# Patient Record
Sex: FEMALE | Race: WHITE | Employment: STUDENT | URBAN - METROPOLITAN AREA
[De-identification: names, ages, dates, MRNs, and addresses within clinical notes are randomized per-mention and may not be internally consistent; named-entity substitution may affect disease eponyms.]

---

## 2017-02-11 DIAGNOSIS — F41.1 ANXIETY, GENERALIZED: ICD-10-CM

## 2017-02-11 RX ORDER — ESCITALOPRAM OXALATE 10 MG/1
TABLET ORAL
Qty: 30 TAB | Refills: 1 | Status: SHIPPED | OUTPATIENT
Start: 2017-02-11 | End: 2017-04-12 | Stop reason: SDUPTHER

## 2017-07-13 DIAGNOSIS — F41.1 ANXIETY, GENERALIZED: ICD-10-CM

## 2017-07-13 RX ORDER — ESCITALOPRAM OXALATE 10 MG/1
TABLET ORAL
Qty: 10 TAB | Refills: 0 | Status: SHIPPED | OUTPATIENT
Start: 2017-07-13 | End: 2021-08-31

## 2020-11-23 ENCOUNTER — OFFICE VISIT (OUTPATIENT)
Dept: URGENT CARE | Age: 25
End: 2020-11-23
Payer: COMMERCIAL

## 2020-11-23 VITALS — RESPIRATION RATE: 14 BRPM | HEART RATE: 92 BPM | OXYGEN SATURATION: 98 % | TEMPERATURE: 98.5 F

## 2020-11-23 DIAGNOSIS — Z20.822 ENCOUNTER FOR LABORATORY TESTING FOR COVID-19 VIRUS: Primary | ICD-10-CM

## 2020-11-23 PROCEDURE — 99202 OFFICE O/P NEW SF 15 MIN: CPT | Performed by: FAMILY MEDICINE

## 2020-11-23 NOTE — PROGRESS NOTES
This patient was seen in [facility] at [address] [place] [address] [facility]gent Care while in their vehicle due to COVID-19 pandemic with PPE and focused examination in order to decrease community viral transmission. The patient/guardian gave verbal consent to treat. The history is provided by the patient. Asymptomatic  Exposure from co-worker with covid positive but  no direct exposure     History reviewed. No pertinent past medical history. History reviewed. No pertinent surgical history. History reviewed. No pertinent family history.      Social History     Socioeconomic History    Marital status: UNKNOWN     Spouse name: Not on file    Number of children: Not on file    Years of education: Not on file    Highest education level: Not on file   Occupational History    Not on file   Social Needs    Financial resource strain: Not on file    Food insecurity     Worry: Not on file     Inability: Not on file    Transportation needs     Medical: Not on file     Non-medical: Not on file   Tobacco Use    Smoking status: Never Smoker    Smokeless tobacco: Never Used   Substance and Sexual Activity    Alcohol use: Not on file    Drug use: Not on file    Sexual activity: Not on file   Lifestyle    Physical activity     Days per week: Not on file     Minutes per session: Not on file    Stress: Not on file   Relationships    Social connections     Talks on phone: Not on file     Gets together: Not on file     Attends Sikhism service: Not on file     Active member of club or organization: Not on file     Attends meetings of clubs or organizations: Not on file     Relationship status: Not on file    Intimate partner violence     Fear of current or ex partner: Not on file     Emotionally abused: Not on file     Physically abused: Not on file     Forced sexual activity: Not on file   Other Topics Concern    Not on file   Social History Narrative    Not on file                ALLERGIES: Patient has no known allergies. Review of Systems   All other systems reviewed and are negative. Vitals:    11/23/20 0844   Pulse: 92   Resp: 14   Temp: 98.5 °F (36.9 °C)   SpO2: 98%       Physical Exam  Vitals signs and nursing note reviewed. Constitutional:       General: She is not in acute distress. Appearance: She is not ill-appearing. Pulmonary:      Effort: Pulmonary effort is normal. No respiratory distress. Breath sounds: No wheezing. MDM    Procedures      ICD-10-CM ICD-9-CM    1. Encounter for laboratory testing for COVID-19 virus  Z20.828 V01.79 NOVEL CORONAVIRUS (COVID-19)     No orders of the defined types were placed in this encounter. No results found for any visits on 11/23/20. The patients condition was discussed with the patient and they understand. The patient is to follow up with primary care doctor. If signs and symptoms become worse the pt is to go to the ER. The patient is to take medications as prescribed.

## 2020-11-25 LAB — SARS-COV-2, NAA: NOT DETECTED

## 2021-08-31 ENCOUNTER — OFFICE VISIT (OUTPATIENT)
Dept: URGENT CARE | Age: 26
End: 2021-08-31
Payer: COMMERCIAL

## 2021-08-31 VITALS — RESPIRATION RATE: 17 BRPM | OXYGEN SATURATION: 99 % | TEMPERATURE: 97.9 F | HEART RATE: 106 BPM

## 2021-08-31 DIAGNOSIS — R05.9 COUGH: Primary | ICD-10-CM

## 2021-08-31 DIAGNOSIS — J02.9 SORE THROAT: ICD-10-CM

## 2021-08-31 DIAGNOSIS — Z11.59 SCREENING FOR VIRAL DISEASE: ICD-10-CM

## 2021-08-31 LAB
S PYO AG THROAT QL: NEGATIVE
SARS-COV-2 POC: NEGATIVE
VALID INTERNAL CONTROL?: YES

## 2021-08-31 PROCEDURE — 87426 SARSCOV CORONAVIRUS AG IA: CPT | Performed by: FAMILY MEDICINE

## 2021-08-31 PROCEDURE — 87880 STREP A ASSAY W/OPTIC: CPT | Performed by: FAMILY MEDICINE

## 2021-08-31 PROCEDURE — 99203 OFFICE O/P NEW LOW 30 MIN: CPT | Performed by: FAMILY MEDICINE

## 2021-08-31 NOTE — PROGRESS NOTES
This patient was seen at [address] [place] [address] Urgent Care while in their vehicle due to COVID-19 pandemic with PPE and focused examination in order to decrease community viral transmission. The patient/guardian gave verbal consent to treat. Griffin Nguyen is a 30 y.o. female who presents cough and ST x 3 days. No known COVID contacts. Denies  fever, SOB, N/V/D. 8 weeks pregnant. Has been taking zyrtec daily. The history is provided by the patient. Past Medical History:   Diagnosis Date    Anxiety     Esophagitis, reflux     Headache     migraine+tension    Rhinitis         Past Surgical History:   Procedure Laterality Date    HX TONSILLECTOMY      HX TYMPANOSTOMY           Family History   Problem Relation Age of Onset    Alcohol abuse Mother    [facility] Anemia Mother     Drug Abuse Father     Alcohol abuse Father         Social History     Socioeconomic History    Marital status: SINGLE     Spouse name: Not on file    Number of children: Not on file    Years of education: Not on file    Highest education level: Not on file   Occupational History    Not on file   Tobacco Use    Smoking status: Never Smoker   Substance and Sexual Activity    Alcohol use: No    Drug use: No    Sexual activity: Not on file   Other Topics Concern    Not on file   Social History Narrative    Not on file     Social Determinants of Health     Financial Resource Strain:     Difficulty of Paying Living Expenses:    Food Insecurity:     Worried About Running Out of Food in the Last Year:     Ran Out of Food in the Last Year:    Transportation Needs:     Lack of Transportation (Medical):      Lack of Transportation (Non-Medical):    Physical Activity:     Days of Exercise per Week:     Minutes of Exercise per Session:    Stress:     Feeling of Stress :    Social Connections:     Frequency of Communication with Friends and Family:     Frequency of Social Gatherings with Friends and Family:     Attends Amish Services:     Active Member of Clubs or Organizations:     Attends Club or Organization Meetings:     Marital Status:    Intimate Partner Violence:     Fear of Current or Ex-Partner:     Emotionally Abused:     Physically Abused:     Sexually Abused: ALLERGIES: Sumatriptan    Review of Systems   Constitutional: Negative for activity change, appetite change and fever. HENT: Positive for sore throat. Respiratory: Positive for cough. Negative for shortness of breath. Gastrointestinal: Negative for diarrhea, nausea and vomiting. Vitals:    08/31/21 1218   Pulse: (!) 106   Resp: 17   Temp: 97.9 °F (36.6 °C)   SpO2: 99%       Physical Exam  Vitals and nursing note reviewed. Constitutional:       General: She is not in acute distress. Appearance: She is well-developed. She is not diaphoretic. HENT:      Mouth/Throat:      Mouth: Mucous membranes are moist.      Pharynx: Oropharynx is clear. Posterior oropharyngeal erythema present. No oropharyngeal exudate. Pulmonary:      Effort: Pulmonary effort is normal. No respiratory distress. Breath sounds: Normal breath sounds. No stridor. No wheezing, rhonchi or rales. Neurological:      Mental Status: She is alert. Psychiatric:         Behavior: Behavior normal.         Thought Content: Thought content normal.         Judgment: Judgment normal.         MDM    ICD-10-CM ICD-9-CM   1. Cough  R05 786.2   2. Sore throat  J02.9 462   3.  Screening for viral disease  Z11.59 V73.99       Orders Placed This Encounter    UPPER RESPIRATORY CULTURE    NOVEL CORONAVIRUS (COVID-19)     Scheduling Instructions:      1) Due to current limited availability of the COVID-19 PCR test, tests will be prioritized and may not be completed.              2) Order only if the test result will change clinical management or necessary for a return to mission-critical employment decision.              3) Print and instruct patient to adhere to CDC home isolation program. (Link Above)              4) Set up or refer patient for a monitoring program.              5) Have patient sign up for and leverage MyChart (if not previously done). Order Specific Question:   Is this test for diagnosis or screening? Answer:   Diagnosis of ill patient     Order Specific Question:   Symptomatic for COVID-19 as defined by CDC? Answer:   Yes     Order Specific Question:   Date of Symptom Onset     Answer:   8/28/2021     Order Specific Question:   Hospitalized for COVID-19? Answer:   No     Order Specific Question:   Admitted to ICU for COVID-19? Answer:   No     Order Specific Question:   Employed in healthcare setting? Answer:   Unknown     Order Specific Question:   Resident in a congregate (group) care setting? Answer:   No     Order Specific Question:   Pregnant? Answer:   Yes     Order Specific Question:   Previously tested for COVID-19? Answer:   Unknown    AMB POC RAPID STREP A    AMB POC SARS-COV-2     Order Specific Question:   Is this test for diagnosis or screening? Answer:   Diagnosis of ill patient     Order Specific Question:   Symptomatic for COVID-19 as defined by CDC? Answer:   Yes     Order Specific Question:   Date of Symptom Onset     Answer:   8/28/2021     Order Specific Question:   Hospitalized for COVID-19? Answer:   No     Order Specific Question:   Admitted to ICU for COVID-19? Answer:   No     Order Specific Question:   Employed in healthcare setting? Answer:   Unknown     Order Specific Question:   Resident in a congregate (group) care setting? Answer:   No     Order Specific Question:   Pregnant? Answer:   Yes     Order Specific Question:   Previously tested for COVID-19?      Answer:   Unknown        Quarantine, await PCR, throat cx results  Deep breathing exercises, ambulation  Tylenol prn  Increase fluids with electrolytes if decreased PO intake  MyChart: active  Provider will call if PCR COVID-19 test is positive     If signs and symptoms become worse the pt is to go to the ER.        Results for orders placed or performed in visit on 08/31/21   AMB POC RAPID STREP A   Result Value Ref Range    VALID INTERNAL CONTROL POC Yes     Group A Strep Ag Negative Negative   AMB POC SARS-COV-2   Result Value Ref Range    SARS-COV-2 POC Negative Negative     Procedures

## 2021-09-02 LAB — SARS-COV-2, NAA: NOT DETECTED

## 2021-09-03 LAB — BACTERIA SPEC RESP CULT: NORMAL

## 2023-03-01 ENCOUNTER — HOSPITAL ENCOUNTER (EMERGENCY)
Age: 28
Discharge: HOME | End: 2023-03-01
Attending: EMERGENCY MEDICINE
Payer: COMMERCIAL

## 2023-03-01 VITALS
TEMPERATURE: 97.6 F | HEART RATE: 93 BPM | SYSTOLIC BLOOD PRESSURE: 132 MMHG | BODY MASS INDEX: 32.42 KG/M2 | HEIGHT: 62 IN | RESPIRATION RATE: 18 BRPM | DIASTOLIC BLOOD PRESSURE: 98 MMHG | WEIGHT: 176.15 LBS | OXYGEN SATURATION: 99 %

## 2023-03-01 DIAGNOSIS — H10.31 ACUTE CONJUNCTIVITIS OF RIGHT EYE, UNSPECIFIED ACUTE CONJUNCTIVITIS TYPE: Primary | ICD-10-CM

## 2023-03-01 PROCEDURE — 99283 EMERGENCY DEPT VISIT LOW MDM: CPT

## 2023-03-01 RX ORDER — MULTIVITAMIN
1 TABLET ORAL DAILY
COMMUNITY

## 2023-03-01 RX ORDER — ERYTHROMYCIN 5 MG/G
0.3 OINTMENT OPHTHALMIC EVERY 6 HOURS
Qty: 3.5 G | Refills: 1 | Status: SHIPPED | OUTPATIENT
Start: 2023-03-01 | End: 2023-03-06

## 2023-03-02 NOTE — DISCHARGE INSTRUCTIONS
Thank you for allowing us to provide you with medical care today. We realize that you have many choices for your emergency care needs. We thank you for choosing [facility]. Please choose us in the future for any continued health care needs. The exam and treatment you received in the Emergency Department were for an emergent problem and are not intended as complete care. It is important that you follow up with a doctor, nurse practitioner, or physician's assistant for ongoing care. If your symptoms worsen or you do not improve as expected and you are unable to reach your usual health care provider, you should return to the Emergency Department. We are available 24 hours a day. Please make an appointment with your health care provider(s) for follow up of your Emergency Department visit. Take this sheet with you when you go to your follow-up visit.

## 2023-03-02 NOTE — ED PROVIDER NOTES
29-year-old female presents with a chief complaint of right eye irritation starting today. She denies visual deficits or other symptoms. Past Medical History:   Diagnosis Date    Anxiety     Esophagitis, reflux     Headache     migraine+tension    Rhinitis        Past Surgical History:   Procedure Laterality Date    HX TONSILLECTOMY      HX TYMPANOSTOMY           Family History:   Problem Relation Age of Onset    Alcohol abuse Mother     Anemia Mother     Drug Abuse Father     Alcohol abuse Father        Social History     Socioeconomic History    Marital status:      Spouse name: Not on file    Number of children: Not on file    Years of education: Not on file    Highest education level: Not on file   Occupational History    Not on file   Tobacco Use    Smoking status: Never    Smokeless tobacco: Never   Substance and Sexual Activity    Alcohol use: Yes     Comment: occasional    Drug use: No    Sexual activity: Not on file   Other Topics Concern    Not on file   Social History Narrative    ** Merged History Encounter **          Social Determinants of Health     Financial Resource Strain: Not on file   Food Insecurity: Not on file   Transportation Needs: Not on file   Physical Activity: Not on file   Stress: Not on file   Social Connections: Not on file   Intimate Partner Violence: Not on file   Housing Stability: Not on file         ALLERGIES: Sumatriptan    Review of Systems   Constitutional:  Negative for fever. Eyes:  Positive for itching. Vitals:    03/01/23 2036   BP: (!) 132/98   Pulse: 93   Resp: 18   Temp: 97.6 °F (36.4 °C)   SpO2: 99%   Weight: 79.9 kg (176 lb 2.4 oz)   Height: 5' 2\" (1.575 m)            Physical Exam  Vitals and nursing note reviewed. Constitutional:       General: She is not in acute distress. Appearance: Normal appearance. She is not ill-appearing, toxic-appearing or diaphoretic. HENT:      Head: Normocephalic and atraumatic.       Mouth/Throat:      Mouth: Mucous membranes are moist.   Eyes:      Extraocular Movements: Extraocular movements intact. Comments: Conjunctivitis of the right eye   Cardiovascular:      Rate and Rhythm: Normal rate. Pulses: Normal pulses. Pulmonary:      Effort: Pulmonary effort is normal. No respiratory distress. Abdominal:      General: There is no distension. Musculoskeletal:         General: Normal range of motion. Cervical back: Normal range of motion. Skin:     General: Skin is dry. Neurological:      Mental Status: She is alert and oriented to person, place, and time. Psychiatric:         Mood and Affect: Mood normal.        Medical Decision Making  We will treat for bacterial conjunctivitis with erythromycin. Discussed my clinical impression(s), any labs and/or radiology results with the patient. I answered any questions and addressed any concerns. Discussed the importance of following up with their primary care physician and/or specialist(s). Discussed signs or symptoms that would warrant return back to the ER for further evaluation. The patient is agreeable with discharge. Risk  Prescription drug management.            Procedures

## 2023-03-02 NOTE — ED TRIAGE NOTES
Pt reports bilateral eye itching, redness and irritation since this morning. Denies blurred or double vision. Does not use contacts.